# Patient Record
Sex: MALE | Race: WHITE | NOT HISPANIC OR LATINO | Employment: FULL TIME | URBAN - METROPOLITAN AREA
[De-identification: names, ages, dates, MRNs, and addresses within clinical notes are randomized per-mention and may not be internally consistent; named-entity substitution may affect disease eponyms.]

---

## 2017-12-19 ENCOUNTER — OFFICE VISIT (OUTPATIENT)
Dept: INTERNAL MEDICINE | Facility: MEDICAL CENTER | Age: 42
End: 2017-12-19
Payer: COMMERCIAL

## 2017-12-19 VITALS
RESPIRATION RATE: 16 BRPM | TEMPERATURE: 97.7 F | BODY MASS INDEX: 30.48 KG/M2 | DIASTOLIC BLOOD PRESSURE: 93 MMHG | WEIGHT: 230 LBS | SYSTOLIC BLOOD PRESSURE: 141 MMHG | OXYGEN SATURATION: 93 % | HEART RATE: 108 BPM | HEIGHT: 73 IN

## 2017-12-19 DIAGNOSIS — Z13.220 SCREENING FOR LIPOID DISORDERS: ICD-10-CM

## 2017-12-19 DIAGNOSIS — Z13.1 SCREENING FOR DIABETES MELLITUS: ICD-10-CM

## 2017-12-19 DIAGNOSIS — F41.9 ANXIETY: ICD-10-CM

## 2017-12-19 DIAGNOSIS — Z86.59 HISTORY OF DEPRESSION: ICD-10-CM

## 2017-12-19 DIAGNOSIS — Z13.29 SCREENING FOR THYROID DISORDER: ICD-10-CM

## 2017-12-19 DIAGNOSIS — Z23 NEED FOR VACCINATION: ICD-10-CM

## 2017-12-19 DIAGNOSIS — Z00.00 HEALTH CARE MAINTENANCE: ICD-10-CM

## 2017-12-19 DIAGNOSIS — R00.2 PALPITATIONS: ICD-10-CM

## 2017-12-19 PROCEDURE — 93000 ELECTROCARDIOGRAM COMPLETE: CPT | Mod: GE | Performed by: INTERNAL MEDICINE

## 2017-12-19 PROCEDURE — 90471 IMMUNIZATION ADMIN: CPT | Performed by: INTERNAL MEDICINE

## 2017-12-19 PROCEDURE — 99203 OFFICE O/P NEW LOW 30 MIN: CPT | Mod: GE,25 | Performed by: INTERNAL MEDICINE

## 2017-12-19 PROCEDURE — 90715 TDAP VACCINE 7 YRS/> IM: CPT | Performed by: INTERNAL MEDICINE

## 2017-12-20 NOTE — PATIENT INSTRUCTIONS
Stress and Stress Management  Stress is a normal reaction to life events. It is what you feel when life demands more than you are used to or more than you can handle. Some stress can be useful. For example, the stress reaction can help you catch the last bus of the day, study for a test, or meet a deadline at work. But stress that occurs too often or for too long can cause problems. It can affect your emotional health and interfere with relationships and normal daily activities. Too much stress can weaken your immune system and increase your risk for physical illness. If you already have a medical problem, stress can make it worse.  CAUSES   All sorts of life events may cause stress. An event that causes stress for one person may not be stressful for another person. Major life events commonly cause stress. These may be positive or negative. Examples include losing your job, moving into a new home, getting , having a baby, or losing a loved one. Less obvious life events may also cause stress, especially if they occur day after day or in combination. Examples include working long hours, driving in traffic, caring for children, being in debt, or being in a difficult relationship.  SIGNS AND SYMPTOMS  Stress may cause emotional symptoms including, the following:  · Anxiety. This is feeling worried, afraid, on edge, overwhelmed, or out of control.  · Anger. This is feeling irritated or impatient.  · Depression. This is feeling sad, down, helpless, or guilty.  · Difficulty focusing, remembering, or making decisions.  Stress may cause physical symptoms, including the following:   · Aches and pains. These may affect your head, neck, back, stomach, or other areas of your body.  · Tight muscles or clenched jaw.  · Low energy or trouble sleeping.   Stress may cause unhealthy behaviors, including the following:   · Eating to feel better (overeating) or skipping meals.  · Sleeping too little, too much, or  "both.  · Working too much or putting off tasks (procrastination).  · Smoking, drinking alcohol, or using drugs to feel better.  DIAGNOSIS   Stress is diagnosed through an assessment by your health care provider. Your health care provider will ask questions about your symptoms and any stressful life events. Your health care provider will also ask about your medical history and may order blood tests or other tests. Certain medical conditions and medicine can cause physical symptoms similar to stress.  Mental illness can cause emotional symptoms and unhealthy behaviors similar to stress. Your health care provider may refer you to a mental health professional for further evaluation.   TREATMENT   Stress management is the recommended treatment for stress. The goals of stress management are reducing stressful life events and coping with stress in healthy ways.   Techniques for reducing stressful life events include the following:  · Stress identification. Self-monitor for stress and identify what causes stress for you. These skills may help you to avoid some stressful events.  · Time management. Set your priorities, keep a calendar of events, and learn to say \"no.\" These tools can help you avoid making too many commitments.  Techniques for coping with stress include the following:  · Rethinking the problem. Try to think realistically about stressful events rather than ignoring them or overreacting. Try to find the positives in a stressful situation rather than focusing on the negatives.  · Exercise. Physical exercise can release both physical and emotional tension. The key is to find a form of exercise you enjoy and do it regularly.  · Relaxation techniques. These relax the body and mind. Examples include yoga, meditation, silvana chi, biofeedback, deep breathing, progressive muscle relaxation, listening to music, being out in nature, journaling, and other hobbies. Again, the key is to find one or more that you enjoy and can " do regularly.  · Healthy lifestyle. Eat a balanced diet, get plenty of sleep, and do not smoke. Avoid using alcohol or drugs to relax.  · Strong support network. Spend time with family, friends, or other people you enjoy being around. Express your feelings and talk things over with someone you trust.  Counseling or talk therapy with a mental health professional may be helpful if you are having difficulty managing stress on your own. Medicine is typically not recommended for the treatment of stress. Talk to your health care provider if you think you need medicine for symptoms of stress.  HOME CARE INSTRUCTIONS  · Keep all follow-up visits as directed by your health care provider.  · Take all medicines as directed by your health care provider.  SEEK MEDICAL CARE IF:  · Your symptoms get worse or you start having new symptoms.  · You feel overwhelmed by your problems and can no longer manage them on your own.  SEEK IMMEDIATE MEDICAL CARE IF:  · You feel like hurting yourself or someone else.     This information is not intended to replace advice given to you by your health care provider. Make sure you discuss any questions you have with your health care provider.     Document Released: 06/13/2002 Document Revised: 01/08/2016 Document Reviewed: 08/12/2014  [employer] Interactive Patient Education ©2016 [employer]

## 2017-12-20 NOTE — NON-PROVIDER
Aries Vargas is a 42 y.o. male here for a non-provider visit for:   TDAP    Reason for immunization: Overdue/Provider Recommended  Immunization records indicate need for vaccine: Yes, confirmed with Epic  Minimum interval has been met for this vaccine: Yes  ABN completed: Not Indicated    Order and dose verified by: YAS  VIS Dated  2/24/15 was given to patient: Yes  IAC Questionnaire abnormal. Questionnaire reviewed and administration of injection approved by provider: ANSWERED YES ON 2    Patient tolerated injection and no adverse effects were observed or reported: Yes    Pt scheduled for next dose in series: Not Indicated

## 2017-12-20 NOTE — PROGRESS NOTES
New Patient to Establish    Reason to establish: New patient to establish    CC: establish care and palpitations for the last month    HPI: 41 y/o M with PMH depression. Presents to the clinic to establish care and complaining of palpitations for the last month. The patient reports that he has palpitations for the last months and it is associated with on going life stress. 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 The patient has a previous similar episode of palpitations and anxiety 9 years ago when he got . The patient reports a previous history of depression that was treated with paroxetine for 9 months successfully.  The patient denies depressed mood, loss interest in daily activities, chest pain, headache, blurred vision, back pain, or abdominal pain.      Patient Active Problem List    Diagnosis Date Noted   • Anxiety 12/19/2017   • History of depression 12/19/2017   • Palpitations 12/19/2017       Past Medical History:   Diagnosis Date   • Anxiety    • History of depression    • Palpitations        Current Outpatient Prescriptions   Medication Sig Dispense Refill   • MOTRIN 600 MG PO TABS        No current facility-administered medications for this visit.        Allergies as of 12/19/2017 - Reviewed 12/19/2017   Allergen Reaction Noted   • Sulfa drugs  09/05/2007       Social History     Social History   • Marital status:      Spouse name: N/A   • Number of children: N/A   • Years of education: N/A     Occupational History   • Not on file.     Social History Main Topics   • Smoking status: Former Smoker   • Smokeless tobacco: Never Used      Comment: quit 10 years ago, used to smoke 1 ppd for 10 years   • Alcohol use Yes      Comment: SOCIAL   • Drug use: No   • Sexual activity: Not on file     Other Topics Concern   • Not on file     Social History Narrative   • No narrative on file       Family History   Problem Relation Age of Onset   • Anxiety disorder Sister   "      History reviewed. No pertinent surgical history.    ROS: As per HPI. Additional pertinent symptoms as noted below.    All others negative    /93   Pulse (!) 108   Temp 36.5 °C (97.7 °F)   Resp 16   Ht 1.854 m (6' 1\")   Wt 59.2 kg (130 lb 9.6 oz)   SpO2 93%   BMI 17.23 kg/m²     Physical Exam  General:  Alert and oriented, No apparent distress.    Eyes: Pupils equal and reactive. No scleral icterus. No proptosis    Throat: Clear no erythema or exudates noted.    Neck: Supple. No lymphadenopathy noted. Thyroid not enlarged.    Lungs: Clear to auscultation and percussion bilaterally.    Cardiovascular: Tachycardia. No murmurs, rubs or gallops.    Abdomen:  Benign. No rebound or guarding noted.    Extremities: No clubbing, cyanosis, edema. No tremor, or hyperreflexia.    Skin: Clear. No rash or suspicious skin lesions noted.    Other:     Note: I have reviewed all pertinent labs and diagnostic tests associated with this visit with specific comments listed under the assessment and plan below    Assessment and Plan    1. Screening for diabetes mellitus  - BMI of 30.34  - Denies Fhx of DM. Denies polyuria, polydipsia, or weight changes.  Plan  HbA1C, and CMP.    2. Screening for lipoid disorders  - BMI of 30.34, never check for lipid panel, presents with palpitations (need to exclude cardiac ischemia, PE)  Plan  - Lipid Panel    3. Anxiety  - Probably anxiety disorder related to environmental factors vs hyperthyroidism vs PE (less likely)  - positive previous similar episode 9 years ago when he got .  - physical examination shows no proptosis, tremor, sweating, chest tenderness, or hyperreflexia.  - EKG at the clinic shows a sinus tachycardia with no ischemia.  Plan  - Test for TSH, T4, D. Dimer, and Cortisol  - Educate about stress relaxation methodes    4. History of depression  - Many years ago, denies depressed mood or anhedonia.  - Was successfully treated with paroxetine for 9 months.    5. " Screening for thyroid disorder  - Pt presented with palpitations with family Hx of hypothyroidism in his mother.  Plan  - Test for TSH, and T4    6. Palpitations  - Probably because of anxiety related to recent environmental stressors vs hyperthyroidism, vs PE (less likely)  - Started one months ago and getting worse, the patient reports having palpitations when he wake up at the morning before getting coffee or even thinking about anything.  - Denies chest pain, excessive coffee drinking, or recreational drugs.  EKG in the clinic shows sinus tachycardia.  Plan  - Test for TSH, T4, Cortisol, and D. Dimer  - follow up in 2 weeks.    7. Need for vaccination  - Due for Flu vaccine (refuse it)  - Due for Tdap, he receives the vaccine today and he tolerates it very well.        Followup: Return in about 2 weeks (around 1/2/2018).    Risk Assessment (discuss potential complications a function of chronic problems): risk assessment has been discussed with the patient     Complexity (discuss number of co-morbidities): co-morbidities have been discussed with the patient.    Signed by: Mercedes Pastrana M.D.

## 2017-12-30 ENCOUNTER — HOSPITAL ENCOUNTER (OUTPATIENT)
Dept: LAB | Facility: MEDICAL CENTER | Age: 42
End: 2017-12-30
Attending: STUDENT IN AN ORGANIZED HEALTH CARE EDUCATION/TRAINING PROGRAM
Payer: COMMERCIAL

## 2017-12-30 DIAGNOSIS — Z13.1 SCREENING FOR DIABETES MELLITUS: ICD-10-CM

## 2017-12-30 DIAGNOSIS — R00.2 PALPITATIONS: ICD-10-CM

## 2017-12-30 DIAGNOSIS — Z00.00 HEALTH CARE MAINTENANCE: ICD-10-CM

## 2017-12-30 LAB
ALBUMIN SERPL BCP-MCNC: 4.2 G/DL (ref 3.2–4.9)
ALBUMIN/GLOB SERPL: 1.2 G/DL
ALP SERPL-CCNC: 61 U/L (ref 30–99)
ALT SERPL-CCNC: 13 U/L (ref 2–50)
ANION GAP SERPL CALC-SCNC: 10 MMOL/L (ref 0–11.9)
AST SERPL-CCNC: 19 U/L (ref 12–45)
BASOPHILS # BLD AUTO: 0.9 % (ref 0–1.8)
BASOPHILS # BLD: 0.06 K/UL (ref 0–0.12)
BILIRUB SERPL-MCNC: 0.6 MG/DL (ref 0.1–1.5)
BUN SERPL-MCNC: 15 MG/DL (ref 8–22)
CALCIUM SERPL-MCNC: 9.2 MG/DL (ref 8.5–10.5)
CHLORIDE SERPL-SCNC: 106 MMOL/L (ref 96–112)
CO2 SERPL-SCNC: 21 MMOL/L (ref 20–33)
CREAT SERPL-MCNC: 0.85 MG/DL (ref 0.5–1.4)
DEPRECATED D DIMER PPP IA-ACNC: <200 NG/ML(D-DU)
EOSINOPHIL # BLD AUTO: 0.24 K/UL (ref 0–0.51)
EOSINOPHIL NFR BLD: 3.5 % (ref 0–6.9)
ERYTHROCYTE [DISTWIDTH] IN BLOOD BY AUTOMATED COUNT: 43.8 FL (ref 35.9–50)
EST. AVERAGE GLUCOSE BLD GHB EST-MCNC: 123 MG/DL
GFR SERPL CREATININE-BSD FRML MDRD: >60 ML/MIN/1.73 M 2
GLOBULIN SER CALC-MCNC: 3.5 G/DL (ref 1.9–3.5)
GLUCOSE SERPL-MCNC: 103 MG/DL (ref 65–99)
HBA1C MFR BLD: 5.9 % (ref 0–5.6)
HCT VFR BLD AUTO: 47.3 % (ref 42–52)
HGB BLD-MCNC: 16.3 G/DL (ref 14–18)
IMM GRANULOCYTES # BLD AUTO: 0.03 K/UL (ref 0–0.11)
IMM GRANULOCYTES NFR BLD AUTO: 0.4 % (ref 0–0.9)
LYMPHOCYTES # BLD AUTO: 2.38 K/UL (ref 1–4.8)
LYMPHOCYTES NFR BLD: 34.6 % (ref 22–41)
MCH RBC QN AUTO: 32.1 PG (ref 27–33)
MCHC RBC AUTO-ENTMCNC: 34.5 G/DL (ref 33.7–35.3)
MCV RBC AUTO: 93.1 FL (ref 81.4–97.8)
MONOCYTES # BLD AUTO: 0.7 K/UL (ref 0–0.85)
MONOCYTES NFR BLD AUTO: 10.2 % (ref 0–13.4)
NEUTROPHILS # BLD AUTO: 3.47 K/UL (ref 1.82–7.42)
NEUTROPHILS NFR BLD: 50.4 % (ref 44–72)
NRBC # BLD AUTO: 0 K/UL
NRBC BLD-RTO: 0 /100 WBC
PLATELET # BLD AUTO: 229 K/UL (ref 164–446)
PMV BLD AUTO: 10.1 FL (ref 9–12.9)
POTASSIUM SERPL-SCNC: 4.2 MMOL/L (ref 3.6–5.5)
PROT SERPL-MCNC: 7.7 G/DL (ref 6–8.2)
RBC # BLD AUTO: 5.08 M/UL (ref 4.7–6.1)
SODIUM SERPL-SCNC: 137 MMOL/L (ref 135–145)
WBC # BLD AUTO: 6.9 K/UL (ref 4.8–10.8)

## 2017-12-30 PROCEDURE — 85025 COMPLETE CBC W/AUTO DIFF WBC: CPT

## 2017-12-30 PROCEDURE — 85379 FIBRIN DEGRADATION QUANT: CPT

## 2017-12-30 PROCEDURE — 80053 COMPREHEN METABOLIC PANEL: CPT

## 2017-12-30 PROCEDURE — 36415 COLL VENOUS BLD VENIPUNCTURE: CPT

## 2017-12-30 PROCEDURE — 83036 HEMOGLOBIN GLYCOSYLATED A1C: CPT

## 2018-01-04 ENCOUNTER — OFFICE VISIT (OUTPATIENT)
Dept: INTERNAL MEDICINE | Facility: MEDICAL CENTER | Age: 43
End: 2018-01-04
Payer: COMMERCIAL

## 2018-01-04 VITALS
SYSTOLIC BLOOD PRESSURE: 136 MMHG | OXYGEN SATURATION: 95 % | HEART RATE: 119 BPM | HEIGHT: 73 IN | DIASTOLIC BLOOD PRESSURE: 100 MMHG | WEIGHT: 235.8 LBS | BODY MASS INDEX: 31.25 KG/M2

## 2018-01-04 DIAGNOSIS — E66.9 OBESITY (BMI 30-39.9): ICD-10-CM

## 2018-01-04 DIAGNOSIS — F41.9 ANXIETY: ICD-10-CM

## 2018-01-04 DIAGNOSIS — R00.0 TACHYCARDIA: ICD-10-CM

## 2018-01-04 PROCEDURE — 99000 SPECIMEN HANDLING OFFICE-LAB: CPT | Performed by: INTERNAL MEDICINE

## 2018-01-04 PROCEDURE — 99214 OFFICE O/P EST MOD 30 MIN: CPT | Mod: GC | Performed by: INTERNAL MEDICINE

## 2018-01-04 RX ORDER — SERTRALINE HYDROCHLORIDE 25 MG/1
25 TABLET, FILM COATED ORAL DAILY
Qty: 30 TAB | Refills: 0 | Status: SHIPPED | OUTPATIENT
Start: 2018-01-04 | End: 2018-01-31 | Stop reason: SDUPTHER

## 2018-01-04 ASSESSMENT — PATIENT HEALTH QUESTIONNAIRE - PHQ9: CLINICAL INTERPRETATION OF PHQ2 SCORE: 0

## 2018-01-04 NOTE — PATIENT INSTRUCTIONS
Follow up in 1 month for Anxiety.  Start zoloft 25 mg daily for 2 weeks, can double if you notice no response to 50 mg daily.

## 2018-01-05 NOTE — PROGRESS NOTES
Established Patient Visit      CC: Anxiety     HPI:   Mr. Aries brown is a 42 year old male who presents for follow up of his anxiety. He is a patient of Dr. Rasheed and is seeing me in the interim.    The patient states that he was seen at his last visit for generalized anxiety and completed his blood work to work up other etiologies before discussing an SSRI. His TSH w/FT4 was not completed for some reason and I was unable to add to his previous labs. The patient states that he continues to have generalized anxiety >4 weeks related to his work at [employer], family particularly his younger son, and schooling. His GAD7 is a 14 today. He denies any IV/recreational drug use at this time. He reports using paroxetine previously without clear improvement as he was not on it long enough. Prozac however made him feel on edge. He denies SI/HI at this time and there are no guns in his home. Does not feel that his child is unsafe in any way. He does not report an episodes of rancho or prolonged depression/dysthymia. He denies psychiatric hospitalizations in the past or suicide attempts.    Patient Active Problem List    Diagnosis Date Noted   • Obesity (BMI 30-39.9) 01/04/2018   • Anxiety 12/19/2017   • History of depression 12/19/2017   • Palpitations 12/19/2017       Past Medical History:   Diagnosis Date   • Anxiety    • History of depression    • Palpitations        Current Outpatient Prescriptions   Medication Sig Dispense Refill   • sertraline (ZOLOFT) 25 MG tablet Take 1 Tab by mouth every day. 30 Tab 0   • MOTRIN 600 MG PO TABS        No current facility-administered medications for this visit.        Allergies as of 01/04/2018 - Reviewed 01/04/2018   Allergen Reaction Noted   • Sulfa drugs  09/05/2007       Social History     Social History   • Marital status:      Spouse name: N/A   • Number of children: N/A   • Years of education: N/A     Occupational History   • Not on file.     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       Family History   Problem Relation Age of Onset   • Anxiety disorder Sister        History reviewed. No pertinent surgical history.    ROS: A complete 14-system review of systems was obtained and is otherwise negative except as stated in the history of present illness, below, and/or the pre-visit questionnaire.     /100   Pulse (!) 119   Ht 1.854 m (6' 1\")   Wt 107 kg (235 lb 12.8 oz)   SpO2 95%   BMI 31.11 kg/m²     Physical Exam  General:  Alert and oriented, No apparent distress.  Eyes: Pupils equal and reactive. No scleral icterus.  Throat: Clear no erythema or exudates noted.  Neck: Supple. No lymphadenopathy noted. Thyroid not enlarged.  Lungs: Clear to auscultation and percussion bilaterally.  Cardiovascular: Regular rate and rhythm. No murmurs, rubs or gallops.  Abdomen:  Benign. No rebound or guarding noted.  Extremities: No clubbing, cyanosis, edema.  Skin: Clear. No rash or suspicious skin lesions noted.  Psych: No SI/HI. No hallucinations, delusions, depressed mood. No psychomotor agitation or retardation. Normal affect. Appears slightly tense.     Orientation: a&o x3    Note: I have reviewed all pertinent labs and diagnostic tests associated with this visit with specific comments listed under the assessment and plan below    Assessment and Plan    1. Anxiety  Ddx inclues generalized anxiety, also tsh wft4 will need to be completed. Per patient, generalized anxiety has impacted his life negatively in a significant way. At this time, a trial of paroxetine would be reasonable while the patient completes his blood work. The " patient does not have prolonged qtc/hyponatremia per recent blood work.   - return to lab to complete blood work including tsh w/ft4  - start paroxetine, can increase dose prn at next visit  - the patient has been counseled on the risks vs benefits including inc risk of suicide especially when starting the medication  - the patient has been counseled to go to the ED if he has thoughts of hurting himself or others  - rtc in 1 month regarding response to ssri     2. Tachycardia  EKG at previous visit showed sinus tachycardia. ddx includes anxiety as above. Patient denies heart palpitations and iv/recreational drug use. TSH w/FT4 pending. Have recommended that patient take pulse at home and if persistently high, would consider further workup including holter.   - Urine drug screen     3. Obesity (BMI 30-39.9)  Patient has been counseled on lifestyle and diet modifications.       Followup: Return in about 5 weeks (around 2/8/2018).      Signed by: Oanh Wilson M.D.

## 2018-01-06 NOTE — PROGRESS NOTES
At the time of the visit, I personally examined the patient and evaluated the patient's medical history, physical examination, laboratory results/studies, and assessment and I discussed the findings and formulated the care plan documented with the resident physician.    Requested he get his labs completed to ensure we are treating him appropriately. He is hopeful to start an SSRI to improve his mood. Should f/u in a few weeks to assess his mood. It appears we rx zoloft rather than paroxetine as noted in the residents's note.     Wili Graves M.D.

## 2018-01-31 ENCOUNTER — TELEPHONE (OUTPATIENT)
Dept: INTERNAL MEDICINE | Facility: MEDICAL CENTER | Age: 43
End: 2018-01-31

## 2018-01-31 DIAGNOSIS — F41.9 ANXIETY: ICD-10-CM

## 2018-02-01 RX ORDER — SERTRALINE HYDROCHLORIDE 25 MG/1
25 TABLET, FILM COATED ORAL DAILY
Qty: 30 TAB | Refills: 0 | Status: SHIPPED | OUTPATIENT
Start: 2018-02-01 | End: 2018-02-26 | Stop reason: SDUPTHER

## 2018-02-01 NOTE — TELEPHONE ENCOUNTER
----- Message from Mehnaz Estrella sent at 1/31/2018  3:25 PM PST -----  Regarding: RX REFILL-AM  Contact: [phone]  Pt states he needs a refill on his Zoloft. Pt states he has 3 left.

## 2018-02-12 ENCOUNTER — OFFICE VISIT (OUTPATIENT)
Dept: INTERNAL MEDICINE | Facility: MEDICAL CENTER | Age: 43
End: 2018-02-12
Payer: COMMERCIAL

## 2018-02-12 VITALS
HEIGHT: 73 IN | WEIGHT: 240 LBS | OXYGEN SATURATION: 93 % | TEMPERATURE: 98.8 F | BODY MASS INDEX: 31.81 KG/M2 | DIASTOLIC BLOOD PRESSURE: 78 MMHG | HEART RATE: 92 BPM | SYSTOLIC BLOOD PRESSURE: 137 MMHG

## 2018-02-12 DIAGNOSIS — E66.9 OBESITY (BMI 30-39.9): ICD-10-CM

## 2018-02-12 DIAGNOSIS — R73.03 PREDIABETES: ICD-10-CM

## 2018-02-12 DIAGNOSIS — F41.9 ANXIETY: ICD-10-CM

## 2018-02-12 PROCEDURE — 99214 OFFICE O/P EST MOD 30 MIN: CPT | Mod: GC | Performed by: INTERNAL MEDICINE

## 2018-02-13 NOTE — PATIENT INSTRUCTIONS
Ansiedad y crisis de angustia  (Anxiety and Panic Attacks)  El profesional que lo asiste le bucio informado que usted padece ansiedad o crisis de angustia. Chloe trastorno puede presentarse de varias formas. La mayor parte de las veces las crisis aparecen de modo repentino y sin aviso. Se producen en cualquier momento del día, incluso brooke el sueño, y en cualquier etapa de la maida. Pueden ser muy intensas e inexplicadas. Aunque un ataque de pánico puede ser muy atemorizante, no produce daños físicos. Algunas veces se desconoce la causa de la ansiedad. La ansiedad es un mecanismo protector del organismo en le respuesta de michelle o escape. Muchas de estas situaciones de percepción de peligro son en realidad situaciones no físicas (paloma la ansiedad de perder el trabajo).  CAUSAS  Las causas de la ansiedad o de un ataque de pánico pueden ser muchas. Los ataques de pánico pueden ocurrir en personas sanas en lynette serie de circunstancias. Es posible que haya lynette causa genética de los ataques de pánico. Algunos medicamentos pueden provocar ansiedad paloma efecto secundario.  SÍNTOMAS  Algunas de las sensaciones más comunes son:  · Terror intenso.  · Vahídos, desfallecimiento.  · Golpes de frío y calor.  · Temor a enloquecer.  · Sentimiento de irrealidad.  · Sudoración.  · Temblores.  · Dolor en el pecho y latidos irregulares (palpitaciones).  · Sensaciones de ahogo o sofocos.  · Sentimiento de peligro inminente y de que la muerte está próxima.  · Hormigueo en las extremidades que puede venir de la respiración agitada.  · Alteración de la realidad (desrealización).  · Sentirse separado de dandy mismo (despersonalización).  Estos son los síntomas (problemas) más comunes y pueden combinarse para presentar la crisis de angustia.   DIAGNÓSTICO  La evaluación que realice el profesional dependerá del tipo de síntomas que esté experimentando. El diagnóstico de la ansiedad o de ataque de pánico se realiza cuando no se encuentra ninguna  enfermedad física que pueda determinarse paloma la causa de los síntomas.  TRATAMIENTO  El tratamiento para prevenir la ansiedad y los ataques de pánico incluye:  · Evitar las circunstancias que causen ansiedad.  · Reaseguro y relajación.  · Ejercicio regular.  · Terapias de relajación, paloma yoga.  · Psicoterapia con un psiquiatra o terapeuta.  · Evitar la cafeína, el alcohol y las drogas ilegales.  · Medicamentos de prescripción.  SOLICITE ATENCIÓN MÉDICA DE INMEDIATO SI:  · Usted experimenta síntomas de ataque de pánico que son distintos a kristi síntomas usuales.  · Tiene cualquier síntoma que empeora o lo preocupa.  Document Released: 12/18/2006 Document Revised: 03/11/2013  [employer]® Patient Information ©2014 [employer].

## 2018-02-13 NOTE — PROGRESS NOTES
"      Established Patient    Aries presents today with the following:    CC: Follow up after using new medication    HPI: Mr. Vargas is a 41 y/o M with PMH of anxiety and depression. Presents to the clinic to follow up after being on Zoloft for the last 5 weeks.  The patient reports that he is doing much better on the new medication and his anxiety is much improved, now he is able to focus more in his studying, working and taking care of his family.  Pt denies headache, chest pain, palpitations, GI side effect, decrease libido, or erectile dysfunction.    Patient Active Problem List    Diagnosis Date Noted   • Obesity (BMI 30-39.9) 01/04/2018   • Anxiety 12/19/2017   • History of depression 12/19/2017   • Palpitations 12/19/2017       Current Outpatient Prescriptions   Medication Sig Dispense Refill   • sertraline (ZOLOFT) 25 MG tablet Take 1 Tab by mouth every day. 30 Tab 0     No current facility-administered medications for this visit.        ROS: As per HPI. Additional pertinent symptoms as noted below.    All others negative    /78   Pulse 92   Temp 37.1 °C (98.8 °F)   Ht 1.854 m (6' 1\")   Wt 108.9 kg (240 lb)   SpO2 93%   BMI 31.66 kg/m²     Physical Exam   Constitutional:  oriented to person, place, and time. No distress.   Eyes: Pupils are equal, round, and reactive to light. No scleral icterus.  Neck: Neck supple. No thyromegaly present.   Cardiovascular: Normal rate, regular rhythm and normal heart sounds.  Exam reveals no gallop and no friction rub.  No murmur heard.  Pulmonary/Chest: Breath sounds normal. Chest wall is not dull to percussion.   Musculoskeletal:   no edema.   Lymphadenopathy: no cervical adenopathy  Neurological: alert and oriented to person, place, and time.   Skin: No cyanosis. Nails show no clubbing.  Other:     Note: I have reviewed all pertinent labs and diagnostic tests associated with this visit with specific comments listed under the assessment and plan " below    Assessment and Plan    1. Obesity (BMI 30-39.9)  - Probably because of more calori intake  - Pt is planning to do more physical exercise and loss weight  - BMI 32  Plan  - Encourage the patient to do more exercise and eat healthy    2. Anxiety  - Improved after using Zoloft 25 mg for 5 weeks.  - Denies medication side effect  - Did not do the TSH because he felt that he is improving on the new medication   Plan  - Continue on same medication  - follow up in 6 months with CMP  - educate the patient about the future plan of continuing on Zoloft for 6-9 months and probably weaning him off the medication if his anxiety is under control    3: prediabetic  - Probably because of obesity  - HbA1C is 5.9 on 12/30/2017  - denies polyuria, polydipsia, weight change, or fatigue  Plan  - educate the patient about diabetes and advice him to loss weight  - The patient is aware about the need for losing weight and eat healthy as that might protect him from having diabetes mellitus.  - Pt is willing to control his diet and eat healthy beside doing scheduled exercise.  - Will check BS and HbA1C on next office visit.      Followup: Return in about 6 months (around 8/12/2018).      Signed by: Mercedes Pastrana M.D.

## 2018-02-26 DIAGNOSIS — F41.9 ANXIETY: ICD-10-CM

## 2018-02-26 RX ORDER — SERTRALINE HYDROCHLORIDE 25 MG/1
25 TABLET, FILM COATED ORAL DAILY
Qty: 90 TAB | Refills: 0 | Status: SHIPPED | OUTPATIENT
Start: 2018-02-26 | End: 2018-06-09 | Stop reason: SDUPTHER

## 2018-02-26 NOTE — TELEPHONE ENCOUNTER
Last seen: 02/12/18 by Dr. Ross  Next appt: None     Was the patient seen in the last year in this department? Yes   Does patient have an active prescription for medications requested? No   Received Request Via: Pharmacy

## 2018-02-26 NOTE — TELEPHONE ENCOUNTER
----- Message from Mehnaz Estrella sent at 2/26/2018 12:23 PM PST -----  Regarding: RX REFILL/SEES DR ORDOÑEZ  Contact: [phone]  Pt states he needs a refill on his Sertraline.

## 2018-06-09 DIAGNOSIS — F41.9 ANXIETY: ICD-10-CM

## 2018-06-11 RX ORDER — SERTRALINE HYDROCHLORIDE 25 MG/1
TABLET, FILM COATED ORAL
Qty: 90 TAB | Refills: 0 | Status: SHIPPED | OUTPATIENT
Start: 2018-06-11 | End: 2018-10-18 | Stop reason: SDUPTHER

## 2018-10-18 DIAGNOSIS — F41.9 ANXIETY: ICD-10-CM

## 2018-10-18 RX ORDER — SERTRALINE HYDROCHLORIDE 25 MG/1
TABLET, FILM COATED ORAL
Qty: 90 TAB | Refills: 0 | Status: SHIPPED | OUTPATIENT
Start: 2018-10-18 | End: 2022-04-14

## 2022-04-14 ENCOUNTER — OFFICE VISIT (OUTPATIENT)
Dept: MEDICAL GROUP | Facility: CLINIC | Age: 47
End: 2022-04-14
Payer: COMMERCIAL

## 2022-04-14 VITALS
HEART RATE: 110 BPM | BODY MASS INDEX: 35.32 KG/M2 | DIASTOLIC BLOOD PRESSURE: 72 MMHG | SYSTOLIC BLOOD PRESSURE: 130 MMHG | HEIGHT: 73 IN | TEMPERATURE: 97.7 F | WEIGHT: 266.5 LBS | OXYGEN SATURATION: 95 %

## 2022-04-14 DIAGNOSIS — Z23 NEED FOR VACCINATION FOR DISEASE COMBINATION: ICD-10-CM

## 2022-04-14 DIAGNOSIS — F41.9 ANXIETY: ICD-10-CM

## 2022-04-14 DIAGNOSIS — T78.40XA ALLERGY, INITIAL ENCOUNTER: ICD-10-CM

## 2022-04-14 PROCEDURE — 99213 OFFICE O/P EST LOW 20 MIN: CPT | Mod: GE | Performed by: STUDENT IN AN ORGANIZED HEALTH CARE EDUCATION/TRAINING PROGRAM

## 2022-04-14 RX ORDER — HYDROXYZINE HYDROCHLORIDE 25 MG/1
25 TABLET, FILM COATED ORAL 2 TIMES DAILY PRN
Qty: 30 TABLET | Refills: 3 | Status: SHIPPED | OUTPATIENT
Start: 2022-04-14 | End: 2022-05-14

## 2022-04-14 RX ORDER — CITALOPRAM 40 MG/1
TABLET ORAL
COMMUNITY
Start: 2021-09-23 | End: 2022-04-14

## 2022-04-14 NOTE — ASSESSMENT & PLAN NOTE
- Discussed various methods of dealing with allergies with the patient including allergy pills, nasal corticosteroids, Gabbi pot  - Patient states that he will take over-the-counter medications and will come back if he feels that his problem is worsening or not improving

## 2022-04-14 NOTE — ASSESSMENT & PLAN NOTE
- Patient is not experiencing any SI/HI/VI  - Patient has had a longstanding prescription for hydroxyzine and this is not a new medication for him  - Hydroxyzine has been refilled  - ED precautions discussed should the patient feel any SI/HI/VI

## 2022-04-14 NOTE — PROGRESS NOTES
"Subjective:     CC: Need for MMR titers    HPI:   Aries presents today with:    Problem   Need for Vaccination for Disease Combination    - Patient is going to be starting a new program at [facility], and they require proof of MMR vaccination  - Patient states that he has had his MMR vaccine in the last 10 years, but cannot find records     Allergies    - The patient states he has been dealing with allergies over the last month, feeling intermittently congested, sneezing  - He is wondering what he can use for his allergies, as he takes an allergy pill but does not think it is helping  - Patient denies any fevers, chills, nausea, vomiting, cough, chest pain or shortness of breath     Anxiety    - Patient works at [employer] and last year his boss committed suicide  - The patient tried citalopram after the boss's death, as he was feeling sadness, was not exercising, and had increased his drinking  - Patient is no longer drinking, he does not smoke, he is exercising daily, he stopped taking citalopram as it made him feel \"foggy\"  - Patient denies SI/HI/VI, states he does not want to start another SSRI and is doing well  - He would like to get a refill of his hydroxyzine, which he uses as needed for sleep when he is feeling anxious         Current Outpatient Medications Ordered in Epic   Medication Sig Dispense Refill   • hydrOXYzine HCl (ATARAX) 25 MG Tab Take 1 Tablet by mouth 2 times a day as needed for Itching for up to 30 days. 30 Tablet 3   • citalopram (CELEXA) 40 MG Tab CELEXA 40 MG TABS (Patient not taking: Reported on 4/14/2022)     • sertraline (ZOLOFT) 25 MG tablet TAKE 1 TABLET BY MOUTH ONCE DAILY (Patient not taking: Reported on 4/14/2022) 90 Tab 0     No current Epic-ordered facility-administered medications on file.       ROS:  Gen: no fevers/chills, no changes in weight  Eyes: no changes in vision  ENT: no changes in hearing  Pulm: no sob, no cough  CV: no chest pain, no palpitations  GI: no nausea/vomiting, no " "diarrhea  MSk: no myalgias  Skin: no rash  Neuro: no headaches, no numbness/tingling      Objective:     Exam:  /72 (BP Location: Right arm, Patient Position: Sitting, BP Cuff Size: Adult)   Pulse (!) 110   Temp 36.5 °C (97.7 °F) (Temporal)   Ht 1.854 m (6' 1\")   Wt 121 kg (266 lb 8 oz)   SpO2 95%   BMI 35.16 kg/m²  Body mass index is 35.16 kg/m².    Gen: Alert and oriented, No apparent distress.  Neck: Neck is supple without lymphadenopathy.  Lungs: Normal effort, CTA bilaterally, no wheezes, rhonchi, or rales  CV: Regular rate and rhythm. No murmurs, rubs, or gallops.  Ext: No clubbing, cyanosis, edema.      Assessment & Plan:     46 y.o. male with the following -     Problem List Items Addressed This Visit     Anxiety     - Patient is not experiencing any SI/HI/VI  - Patient has had a longstanding prescription for hydroxyzine and this is not a new medication for him  - Hydroxyzine has been refilled  - ED precautions discussed should the patient feel any SI/HI/VI         Relevant Medications    citalopram (CELEXA) 40 MG Tab    hydrOXYzine HCl (ATARAX) 25 MG Tab    Need for vaccination for disease combination     - MMR titer ordered         Relevant Orders    MEASLES/MUMPS/RUBELLA IMMUNITY    Allergies     - Discussed various methods of dealing with allergies with the patient including allergy pills, nasal corticosteroids, [place] pot  - Patient states that he will take over-the-counter medications and will come back if he feels that his problem is worsening or not improving             -Patient will follow up in approximately 6 months    Suresh Oquendo MD   PGY2        "

## 2022-04-21 ENCOUNTER — HOSPITAL ENCOUNTER (OUTPATIENT)
Dept: LAB | Facility: MEDICAL CENTER | Age: 47
End: 2022-04-21
Attending: STUDENT IN AN ORGANIZED HEALTH CARE EDUCATION/TRAINING PROGRAM
Payer: COMMERCIAL

## 2022-04-21 DIAGNOSIS — Z23 NEED FOR VACCINATION FOR DISEASE COMBINATION: ICD-10-CM

## 2022-04-21 PROCEDURE — 36415 COLL VENOUS BLD VENIPUNCTURE: CPT

## 2022-04-21 PROCEDURE — 86765 RUBEOLA ANTIBODY: CPT

## 2022-04-21 PROCEDURE — 86735 MUMPS ANTIBODY: CPT

## 2022-04-21 PROCEDURE — 86762 RUBELLA ANTIBODY: CPT

## 2022-04-22 LAB
MEV IGG SER IA-ACNC: 0.11
MUV IGG SER IA-ACNC: 0.16
RUBV AB SER QL: 35.3 IU/ML

## 2022-05-06 ENCOUNTER — APPOINTMENT (OUTPATIENT)
Dept: MEDICAL GROUP | Facility: CLINIC | Age: 47
End: 2022-05-06
Payer: COMMERCIAL

## 2022-05-17 ENCOUNTER — APPOINTMENT (OUTPATIENT)
Dept: MEDICAL GROUP | Facility: CLINIC | Age: 47
End: 2022-05-17
Payer: COMMERCIAL

## 2024-01-09 ENCOUNTER — PATIENT MESSAGE (OUTPATIENT)
Dept: MEDICAL GROUP | Facility: CLINIC | Age: 49
End: 2024-01-09
Payer: COMMERCIAL

## 2024-01-09 ENCOUNTER — OFFICE VISIT (OUTPATIENT)
Dept: MEDICAL GROUP | Facility: CLINIC | Age: 49
End: 2024-01-09
Payer: COMMERCIAL

## 2024-01-09 VITALS
BODY MASS INDEX: 35.39 KG/M2 | RESPIRATION RATE: 16 BRPM | DIASTOLIC BLOOD PRESSURE: 84 MMHG | HEART RATE: 99 BPM | HEIGHT: 73 IN | SYSTOLIC BLOOD PRESSURE: 116 MMHG | OXYGEN SATURATION: 95 % | WEIGHT: 267 LBS | TEMPERATURE: 97.6 F

## 2024-01-09 DIAGNOSIS — Z12.11 SCREENING FOR COLORECTAL CANCER: ICD-10-CM

## 2024-01-09 DIAGNOSIS — R06.09 DYSPNEA ON EXERTION: Primary | ICD-10-CM

## 2024-01-09 DIAGNOSIS — Z12.12 SCREENING FOR COLORECTAL CANCER: ICD-10-CM

## 2024-01-09 DIAGNOSIS — Z00.00 HEALTHCARE MAINTENANCE: ICD-10-CM

## 2024-01-09 DIAGNOSIS — F41.9 ANXIETY: ICD-10-CM

## 2024-01-09 PROCEDURE — 3079F DIAST BP 80-89 MM HG: CPT | Performed by: FAMILY MEDICINE

## 2024-01-09 PROCEDURE — 3074F SYST BP LT 130 MM HG: CPT | Performed by: FAMILY MEDICINE

## 2024-01-09 PROCEDURE — 99214 OFFICE O/P EST MOD 30 MIN: CPT | Performed by: FAMILY MEDICINE

## 2024-01-09 RX ORDER — HYDROXYZINE HYDROCHLORIDE 25 MG/1
25 TABLET, FILM COATED ORAL
COMMUNITY
End: 2024-01-09 | Stop reason: SDUPTHER

## 2024-01-09 RX ORDER — HYDROXYZINE HYDROCHLORIDE 25 MG/1
25 TABLET, FILM COATED ORAL EVERY 8 HOURS PRN
Qty: 30 TABLET | Refills: 2 | Status: SHIPPED | OUTPATIENT
Start: 2024-01-09

## 2024-01-09 ASSESSMENT — PATIENT HEALTH QUESTIONNAIRE - PHQ9: CLINICAL INTERPRETATION OF PHQ2 SCORE: 0

## 2024-01-09 ASSESSMENT — ENCOUNTER SYMPTOMS
PALPITATIONS: 1
SHORTNESS OF BREATH: 1

## 2024-01-09 NOTE — PROGRESS NOTES
Subjective:     Chief Complaint   Patient presents with    Follow-Up     Here for follow up and needs refill of his Hydroxizine       HPI: Aries is a 48 y.o. male who presents today for the following problems:    Primary purpose for today's visit is to get a refill on hydroxyzine.  He was prescribed this due to his symptoms consistent with social anxiety.  He usually only has to take them when he enters crowded spaces.  He is able to tolerate this medication well and it does not impact his ability to do his job and there is no risk he reports when he does things such as operate machine.  He feels very safe taking it.  He would like to continue.  He reports that he only uses about 10 tablets a month.  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    He does not like to get influenza shots.  We discussed colorectal cancer screening, he defers getting any colonoscopy currently he would like to consider his options.  Upon asking if he gets palpitations, he does report that occasionally he will get some shortness of breath after some heavy labor at work, however he reports that this is probably because of his weight.  Denies any significant symptoms and reports that this is very rare like once or twice a month.  Reports that these episodes of shortness of breath are very rare.  Denies chest pain or dyspnea today.    Depression Screening    Little interest or pleasure in doing things?  0 - not at all  Feeling down, depressed , or hopeless? 0 - not at all  Patient Health Questionnaire Score: 0    If depressive symptoms identified deferred to follow up visit unless specifically addressed in assesment and plan.    SAMMIE-7 Questionnaire    Feeling nervous, anxious, or on edge:  1  Not being able to sop or control worryin    Worrying too much about different things:  2  Trouble relaxing:   "0  Being so restless that it's hard to sit still:  0  Becoming easily annoyed or irritable:  1  Feeling afraid as if something awful might happen:  0  Total:  5    Interpretation of SAMMIE 7 Total Score   Score Severity :  0-4 No Anxiety   5-9 Mild Anxiety  10-14 Moderate Anxiety  15-21 Severe Anxiety           No problems updated.    Current Outpatient Medications   Medication Sig Dispense Refill    hydrOXYzine HCl (ATARAX) 25 MG Tab Take 1 Tablet by mouth every 8 hours as needed for Anxiety. 30 Tablet 2     No current facility-administered medications for this visit.       Social History     Tobacco Use    Smoking status: Former    Smokeless tobacco: Never    Tobacco comments:     quit 10 years ago, used to smoke 1 ppd for 10 years   Substance Use Topics    Alcohol use: Yes     Comment: SOCIAL    Drug use: No        Review of Systems   Respiratory:  Positive for shortness of breath.    Cardiovascular:  Positive for palpitations. Negative for chest pain.       Objective:     Vitals:    01/09/24 1500   BP: 116/84   BP Location: Left arm   Patient Position: Sitting   BP Cuff Size: Large adult   Pulse: 99   Resp: 16   Temp: 36.4 °C (97.6 °F)   TempSrc: Temporal   SpO2: 95%   Weight: 121 kg (267 lb)   Height: 1.854 m (6' 1\")     Body mass index is 35.23 kg/m².     Physical Exam:  Gen: Well developed, well nourished in no acute distress.   Skin: Pink, warm, and dry  HEENT: conjunctiva non-injected  Cardiovascular: Regular rate and rhythm, no murmurs gallops or rubs  Lungs: Clear to auscultation bilaterally, no wheezes rales or rhonchi, good air movement throughout all lung fields  Alert and oriented Eye contact is good, speech goal directed, affect calm  Gait: not antalgic    Assessment & Plan:   No problem-specific Assessment & Plan notes found for this encounter.    1. Dyspnea on exertion  Patient does not have any red flag symptoms at this time such as chest pain or shortness of breath.  The incidental finding that he " has occasional dyspnea is likely due to his weight and deconditioning.  However, will order stress EKG to ensure that there is no type of anginal type symptoms.  EKG was recommended in clinic, however patient defers.  Will follow-up results of stress test.  - Cardiac Stress Test Treadmill Only    2. Anxiety  Patient's signs and symptoms are most consistent with social anxiety and more populated places.  Given that he tolerates the medicine well without any adverse effects, will refill.  - hydrOXYzine HCl (ATARAX) 25 MG Tab; Take 1 Tablet by mouth every 8 hours as needed for Anxiety.  Dispense: 30 Tablet; Refill: 2    3. Screening for colorectal cancer  Patient defers colorectal cancer screening at this time, he will think about it and follow-up in a couple weeks.    4. Healthcare maintenance  Patient defers labs today.    Patient verbalizes understanding and agreement with assessment and plan.    Followup: Return in about 2 weeks (around 1/23/2024), or if symptoms worsen or fail to improve.    Jaskaran Tabor M.D.    Please note that this dictation was created using voice recognition software. I have made every reasonable attempt to correct obvious errors, but I expect that there are errors of grammar and possibly content that I did not discover before finalizing the note.